# Patient Record
Sex: MALE
[De-identification: names, ages, dates, MRNs, and addresses within clinical notes are randomized per-mention and may not be internally consistent; named-entity substitution may affect disease eponyms.]

---

## 2018-06-26 PROBLEM — Z00.00 ENCOUNTER FOR PREVENTIVE HEALTH EXAMINATION: Status: ACTIVE | Noted: 2018-06-26

## 2018-06-28 ENCOUNTER — APPOINTMENT (OUTPATIENT)
Dept: ANTEPARTUM | Facility: CLINIC | Age: 28
End: 2018-06-28

## 2024-05-19 ENCOUNTER — EMERGENCY (EMERGENCY)
Facility: HOSPITAL | Age: 34
LOS: 1 days | Discharge: HOME | End: 2024-05-19
Attending: STUDENT IN AN ORGANIZED HEALTH CARE EDUCATION/TRAINING PROGRAM
Payer: SELF-PAY

## 2024-05-19 VITALS
OXYGEN SATURATION: 96 % | SYSTOLIC BLOOD PRESSURE: 100 MMHG | RESPIRATION RATE: 20 BRPM | WEIGHT: 157.63 LBS | HEIGHT: 61 IN | HEART RATE: 104 BPM | TEMPERATURE: 98 F | DIASTOLIC BLOOD PRESSURE: 61 MMHG

## 2024-05-19 VITALS
RESPIRATION RATE: 18 BRPM | SYSTOLIC BLOOD PRESSURE: 102 MMHG | DIASTOLIC BLOOD PRESSURE: 64 MMHG | TEMPERATURE: 98 F | HEART RATE: 83 BPM | OXYGEN SATURATION: 97 %

## 2024-05-19 LAB
FLUAV AG NPH QL: SIGNIFICANT CHANGE UP
FLUBV AG NPH QL: SIGNIFICANT CHANGE UP
RSV RNA NPH QL NAA+NON-PROBE: SIGNIFICANT CHANGE UP
SARS-COV-2 RNA SPEC QL NAA+PROBE: SIGNIFICANT CHANGE UP

## 2024-05-19 PROCEDURE — 99284 EMERGENCY DEPT VISIT MOD MDM: CPT

## 2024-05-19 PROCEDURE — 96372 THER/PROPH/DIAG INJ SC/IM: CPT

## 2024-05-19 PROCEDURE — 87637 SARSCOV2&INF A&B&RSV AMP PRB: CPT

## 2024-05-19 PROCEDURE — 99283 EMERGENCY DEPT VISIT LOW MDM: CPT | Mod: 25

## 2024-05-19 PROCEDURE — 71046 X-RAY EXAM CHEST 2 VIEWS: CPT

## 2024-05-19 PROCEDURE — 71046 X-RAY EXAM CHEST 2 VIEWS: CPT | Mod: 26

## 2024-05-19 RX ORDER — ONDANSETRON 8 MG/1
1 TABLET, FILM COATED ORAL
Qty: 6 | Refills: 0
Start: 2024-05-19 | End: 2024-05-20

## 2024-05-19 RX ORDER — KETOROLAC TROMETHAMINE 30 MG/ML
30 SYRINGE (ML) INJECTION ONCE
Refills: 0 | Status: DISCONTINUED | OUTPATIENT
Start: 2024-05-19 | End: 2024-05-19

## 2024-05-19 RX ORDER — ONDANSETRON 8 MG/1
4 TABLET, FILM COATED ORAL ONCE
Refills: 0 | Status: COMPLETED | OUTPATIENT
Start: 2024-05-19 | End: 2024-05-19

## 2024-05-19 RX ADMIN — ONDANSETRON 4 MILLIGRAM(S): 8 TABLET, FILM COATED ORAL at 12:20

## 2024-05-19 RX ADMIN — Medication 30 MILLIGRAM(S): at 12:20

## 2024-05-19 NOTE — ED ADULT NURSE NOTE - OBJECTIVE STATEMENT
Pt care assumed 1215. Pt A&Ox4. Pt arrives to ED c/o non productive cough and fever since yesterday. Pt states he had 1x episode of vomiting. Pt c/o generalized body aches throughout. Son presents with similar symptoms. Respirations even and unlabored. Denies recent travel.

## 2024-05-19 NOTE — ED PROVIDER NOTE - PATIENT PORTAL LINK FT
You can access the FollowMyHealth Patient Portal offered by [facility] by registering at the following website: [email or web address]. By joining [employer]’s FollowMyHealth portal, you will also be able to view your health information using other applications (apps) compatible with our system.

## 2024-05-19 NOTE — ED PROVIDER NOTE - PHYSICAL EXAMINATION
Constitutional: Awake, alert and oriented. In no acute distress. Well appearing.  HEENT: NC/AT. Moist mucous membranes.  Eyes: No scleral icterus. EOMI.  Neck:. Soft and supple. Full ROM without pain.  Cardiac: Regular rate and regular rhythm. +S1/S2. Peripheral pulses 2+ and symmetric. No LE edema.  Respiratory: Speaking in full sentences. No evidence of respiratory distress. No wheezes, rales or rhonchi.  Abdomen: Soft, non-distended and non tender Normal bowel sounds in all 4 quadrants. No guarding or rebound. no CVAT  Back: Spine midline and non-tender.   Skin: No rashes, abrasions or lacerations.  Lymph: No cervical lymphadenopathy.  Neuro: Awake, alert & oriented x 3.   Psych: calm, cooperative, normal affect

## 2024-05-19 NOTE — ED PROVIDER NOTE - ATTENDING APP SHARED VISIT CONTRIBUTION OF CARE
34 y/o M with no reported PMHx/PSHx p/w c/o subjective fever/chills, dry cough, and 2 episodes of NBNB emesis since last evening, son w. similar sx, cxr neg, stable for outpt follow up w. return precautions for any worsening

## 2024-05-19 NOTE — ED PROVIDER NOTE - CLINICAL SUMMARY MEDICAL DECISION MAKING FREE TEXT BOX
32 y/o M with no reported PMHx/PSHx p/w c/o subjective fever/chills, dry cough, and 2 episodes of NBNB emesis since last evening. Most likely viral illness. Flu swab: pending. CXR: no acute findings. Rx zofran prn and instructed to f/u with PCP within  1 week. Strict ED return precautions given if any new or worsening symptoms.

## 2024-05-19 NOTE — ED PROVIDER NOTE - NSFOLLOWUPCLINICS_GEN_ALL_ED_FT
[id number] [place] [address]  Phone: [phone]  Fax:   Follow Up Time: 7-10 Days

## 2024-05-19 NOTE — ED ADULT NURSE NOTE - NSFALLUNIVINTERV_ED_ALL_ED
Bed/Stretcher in lowest position, wheels locked, appropriate side rails in place/Call bell, personal items and telephone in reach/Instruct patient to call for assistance before getting out of bed/chair/stretcher/Non-slip footwear applied when patient is off stretcher/[place] to call system/Physically safe environment - no spills, clutter or unnecessary equipment/Purposeful proactive rounding/Room/bathroom lighting operational, light cord in reach

## 2024-05-19 NOTE — ED PROVIDER NOTE - OBJECTIVE STATEMENT
34 y/o M with no reported PMHx/PSHx p/w c/o subjective fever/chills, dry cough, and 2 episodes of NBNB emesis since last evening. Patient's son is also currently sick. Denies headaches, dizziness, chest pain, sob, hemoptysis, leg swelling/calf tenderness, palpitations, abdominal pain, back pain, diarrhea/bloody stools/melena, rash, neck stiffness, sore throat, ear pain, urinary symptoms and with no other c/o. No hx of recent travel.

## 2025-01-06 ENCOUNTER — EMERGENCY (EMERGENCY)
Facility: HOSPITAL | Age: 35
LOS: 1 days | Discharge: HOME | End: 2025-01-06
Attending: STUDENT IN AN ORGANIZED HEALTH CARE EDUCATION/TRAINING PROGRAM
Payer: COMMERCIAL

## 2025-01-06 VITALS
HEART RATE: 79 BPM | TEMPERATURE: 99 F | WEIGHT: 154.98 LBS | SYSTOLIC BLOOD PRESSURE: 118 MMHG | HEIGHT: 65 IN | OXYGEN SATURATION: 100 % | RESPIRATION RATE: 18 BRPM | DIASTOLIC BLOOD PRESSURE: 80 MMHG

## 2025-01-06 PROBLEM — Z78.9 OTHER SPECIFIED HEALTH STATUS: Chronic | Status: ACTIVE | Noted: 2024-05-19

## 2025-01-06 PROCEDURE — T1013: CPT

## 2025-01-06 PROCEDURE — 10060 I&D ABSCESS SIMPLE/SINGLE: CPT

## 2025-01-06 PROCEDURE — 99283 EMERGENCY DEPT VISIT LOW MDM: CPT | Mod: 25

## 2025-01-06 PROCEDURE — 10061 I&D ABSCESS COMP/MULTIPLE: CPT

## 2025-01-06 RX ORDER — DOXYCYCLINE MONOHYDRATE 100 MG
100 TABLET ORAL ONCE
Refills: 0 | Status: COMPLETED | OUTPATIENT
Start: 2025-01-06 | End: 2025-01-06

## 2025-01-06 RX ORDER — DOXYCYCLINE MONOHYDRATE 100 MG
1 TABLET ORAL
Qty: 14 | Refills: 0
Start: 2025-01-06 | End: 2025-01-12

## 2025-01-06 RX ADMIN — Medication 100 MILLIGRAM(S): at 13:34

## 2025-01-06 NOTE — ED PROVIDER NOTE - ATTENDING APP SHARED VISIT CONTRIBUTION OF CARE
I, Bong Riley MD, have seen and examined the patient on the date of service.  I agree with the KELI's assessment as written, with exceptions or additions as noted below or in a separate note.  Patient presenting with concern for abscess in the suprapubic region.  No fevers.  States that he has had very minimal drainage from the site.  On exam in the suprapubic region there appears to be a superficial abscess with surrounding erythema.  There is no associated tenderness outside of the region of erythema.  Concern for abscess with cellulitis.  Given superficial nature, do not suspect deeper infection.  Does not involve the groin.  Will perform incision and drainage.  Plan to start antibiotics.

## 2025-01-06 NOTE — ED PROVIDER NOTE - NSFOLLOWUPINSTRUCTIONS_ED_ALL_ED_FT
Alert and oriented x4. Offered fluids and snacks. Safety precautions in placed. Bed in lowest position. Upper side rails up. Treaded socks on. Reinforced the use of call light when needing assistance.    Abscess    An abscess is an infected area that contains a collection of pus and debris. It can occur in almost any part of the body and occurs when the tissue gets infection. Symptoms include a painful mass that is red, warm, tender that might break open and HAVE drainage. If your health care provider gave you antibiotics make sure to take the full course and do not stop even if feeling better.     SEEK IMMEDIATE MEDICAL CARE IF YOU HAVE ANY OF THE FOLLOWING SYMPTOMS: chills, fever, muscle aches, or red streaking from the area.     TAKE ANTIBIOTICS AS PRESCRIBED.

## 2025-01-06 NOTE — ED PROVIDER NOTE - PATIENT PORTAL LINK FT
You can access the FollowMyHealth Patient Portal offered by [facility] by registering at the following website: [email or web address]. By joining [employer]’s FollowMyHealth portal, you will also be able to view your health information using other applications (apps) compatible with our system.

## 2025-01-06 NOTE — ED ADULT TRIAGE NOTE - CHIEF COMPLAINT QUOTE
c/o absences in lower [facility] region since Thursday. denies drainage or fevers.

## 2025-01-08 ENCOUNTER — EMERGENCY (EMERGENCY)
Facility: HOSPITAL | Age: 35
LOS: 1 days | Discharge: HOME | End: 2025-01-08
Attending: STUDENT IN AN ORGANIZED HEALTH CARE EDUCATION/TRAINING PROGRAM
Payer: COMMERCIAL

## 2025-01-08 VITALS
SYSTOLIC BLOOD PRESSURE: 122 MMHG | HEIGHT: 65 IN | HEART RATE: 77 BPM | DIASTOLIC BLOOD PRESSURE: 79 MMHG | OXYGEN SATURATION: 97 % | TEMPERATURE: 98 F | WEIGHT: 156.97 LBS | RESPIRATION RATE: 16 BRPM

## 2025-01-08 PROCEDURE — 99283 EMERGENCY DEPT VISIT LOW MDM: CPT | Mod: 25

## 2025-01-08 PROCEDURE — T1013: CPT

## 2025-01-08 PROCEDURE — 99283 EMERGENCY DEPT VISIT LOW MDM: CPT

## 2025-01-08 RX ORDER — ACETAMINOPHEN 80 MG/.8ML
975 SOLUTION/ DROPS ORAL ONCE
Refills: 0 | Status: COMPLETED | OUTPATIENT
Start: 2025-01-08 | End: 2025-01-08

## 2025-01-08 RX ORDER — IBUPROFEN 200 MG
600 TABLET ORAL ONCE
Refills: 0 | Status: COMPLETED | OUTPATIENT
Start: 2025-01-08 | End: 2025-01-08

## 2025-01-08 RX ADMIN — Medication 600 MILLIGRAM(S): at 04:35

## 2025-01-08 RX ADMIN — ACETAMINOPHEN 975 MILLIGRAM(S): 80 SOLUTION/ DROPS ORAL at 04:35

## 2025-01-08 NOTE — ED PROVIDER NOTE - OBJECTIVE STATEMENT
35 yo male no PMHx presents to ED for removal of packing. Patient seen and evaluated for suprapubic abscess which was drained on 1/6 and packed. Discharged on doxycyline which he has been compliant with. Patient finds the packing to be causing pain and wants it removed. No other complaints at this time.   Denies fevers.

## 2025-01-08 NOTE — ED PROVIDER NOTE - CLINICAL SUMMARY MEDICAL DECISION MAKING FREE TEXT BOX
35 yo male no PMHx presents to ED for removal of packing. Patient seen and evaluated for suprapubic abscess which was drained on 1/6 and packed. Discharged on doxycyline which he has been complaint with. Patient finds the packing to be causing pain and wants it removed. Patient afebrile. Incision with packing in place over suprapubic region. No purulent drainage able to be expressed following packing removal. Skin appears significantly improved from picture on phone at initial presentation. No crepitus. Packing removed. Medically stable for discharge.

## 2025-01-08 NOTE — ED PROVIDER NOTE - ATTENDING APP SHARED VISIT CONTRIBUTION OF CARE
35 yo male no PMHx presents to ED for removal of packing. Patient seen and evaluated for suprapubic abscess which was drained on 1/6 and packed. Discharged on doxycyline which he has been compliant with. Patient finds the packing to be causing pain and wants it removed. No other complaints at this time.   Denies fevers.  I, Trevon Mcgrath, evaluated the patient with the PA, and completed the key components of the history and physical exam. I then discussed the management plan with the PA.

## 2025-01-08 NOTE — ED PROVIDER NOTE - NSFOLLOWUPINSTRUCTIONS_ED_ALL_ED_FT
- Continue antibiotics as prescribed.  - Ibuprofen 600mg every 6 hours as needed for pain.  - Acetaminophen 650mg every 6 hours as needed for pain.  - Keep clean with soap and water.   - Please bring all documentation from your ED visit to any related future follow up appointment.  - Please call to schedule follow up appointment with your primary care physician within 24-48 hours.  - Please seek immediate medical attention or return to the ED for any new/worsening, signs/symptoms, or concerns.        - Continuar con los antibióticos según lo prescrito.  - Ibuprofeno 600 mg cada 6 horas según sea necesario para el dolor.  - Acetaminofén 650 mg cada 6 horas según sea necesario para el dolor.  - Mantener limpio con agua y jabón.   - Traiga toda la documentación de de la cruz visita al servicio de urgencias a cualquier yola de seguimiento futura relacionada.  - Llame para programar adrian yola de seguimiento con de la cruz médico de atención primaria dentro de las 24 a 48 horas.  - Busque atención médica inmediata o regrese al servicio de urgencias si detecta signos, síntomas o inquietudes nuevos o que empeoran.

## 2025-01-08 NOTE — ED ADULT TRIAGE NOTE - CHIEF COMPLAINT QUOTE
patient reports pain to lower abdomen, worsening since yesterday, reports having abcess drained to that area 2 days ago, denied fever or chills

## 2025-01-08 NOTE — ED PROVIDER NOTE - PATIENT PORTAL LINK FT
You can access the FollowMyHealth Patient Portal offered by [facility] by registering at the following website: [email or web address]. By joining [employer]’s FollowMyHealth portal, you will also be able to view your health information using other applications (apps) compatible with our system.

## 2025-01-08 NOTE — ED PROVIDER NOTE - PHYSICAL EXAMINATION
Gen: Nontoxic, well appearing, in NAD.  Skin: Warm and dry as visualized. Incision with packing in place over suprapubic region. No purulent drainage able to be expressed following packing removal. Skin appears significantly improved from picture on phone at initial presentation. No crepitus.   Head: NC/AT.  Eyes: PERRLA. EOMI.  Neck: Supple, FROM. Trachea midline.   Resp: No distress.  Cardio: Well perfused.  Ext: No deformities. MAEx4. FROM.   Neuro: A&Ox3. Appears nonfocal.   Psych: Normal affect and mood.